# Patient Record
(demographics unavailable — no encounter records)

---

## 2025-07-11 NOTE — PHYSICAL EXAM
[Chaperoned Physical Exam] : A chaperone was present in the examining room during all aspects of the physical examination. [Labia Majora Erythema] : erythema [Labia Minora Erythema] : erythema [Erythematous] : erythema [Discharge] : a  ~M vaginal discharge was present [Normal] : normal [Uterine Adnexae] : normal [FreeTextEntry4] : white, clumpy dishcarge

## 2025-07-11 NOTE — HISTORY OF PRESENT ILLNESS
[FreeTextEntry1] :  34 year F presents witih vaginal pruritis and clumpy discharge on and off x months   LMP: 2025 Menses: 13yo, monthly, 5d SA/Contraception: Yes, men, condoms   OBHx:   x 2 D&C x 1, TOP GYNHx: Denies PMHx: Pre-DM PSHx: D&C FamHx: Denies fam hx of Cancer Meds: Denies Allergies: NKDA Soc Hx: Social ETOH   Health Maintenance: Last Pap -2024 Gardasil - Never, but desired